# Patient Record
Sex: FEMALE | Race: WHITE | Employment: STUDENT | URBAN - METROPOLITAN AREA
[De-identification: names, ages, dates, MRNs, and addresses within clinical notes are randomized per-mention and may not be internally consistent; named-entity substitution may affect disease eponyms.]

---
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
## 2019-06-03 ENCOUNTER — OFFICE VISIT (OUTPATIENT)
Dept: URGENT CARE | Facility: URGENT CARE | Age: 19
End: 2019-06-03
Payer: COMMERCIAL

## 2019-06-03 VITALS
DIASTOLIC BLOOD PRESSURE: 70 MMHG | OXYGEN SATURATION: 99 % | HEART RATE: 89 BPM | TEMPERATURE: 97.5 F | WEIGHT: 128 LBS | SYSTOLIC BLOOD PRESSURE: 106 MMHG | BODY MASS INDEX: 20.66 KG/M2

## 2019-06-03 DIAGNOSIS — J02.9 ACUTE PHARYNGITIS, UNSPECIFIED ETIOLOGY: Primary | ICD-10-CM

## 2019-06-03 DIAGNOSIS — H65.91 OME (OTITIS MEDIA WITH EFFUSION), RIGHT: ICD-10-CM

## 2019-06-03 PROCEDURE — 99213 OFFICE O/P EST LOW 20 MIN: CPT | Performed by: NURSE PRACTITIONER

## 2019-06-03 RX ORDER — AMOXICILLIN 875 MG
875 TABLET ORAL 2 TIMES DAILY
Qty: 20 TABLET | Refills: 0 | Status: SHIPPED | OUTPATIENT
Start: 2019-06-03 | End: 2019-09-25

## 2019-06-03 ASSESSMENT — ENCOUNTER SYMPTOMS
RESPIRATORY NEGATIVE: 1
NEUROLOGICAL NEGATIVE: 1
CARDIOVASCULAR NEGATIVE: 1
SORE THROAT: 1
CONSTITUTIONAL NEGATIVE: 1

## 2019-06-04 NOTE — PROGRESS NOTES
HPI  Mechelle Metcalf 18 year old present with 2 week hx of sore throat. Now she has accompanying ear pain.     Past Medical History:   Diagnosis Date     NO ACTIVE PROBLEMS       Patient Active Problem List   Diagnosis     ADHD, predominantly inattentive type     Disruptive behavior disorder      Past Surgical History:   Procedure Laterality Date     NO HISTORY OF SURGERY        No Known Allergies  Current Outpatient Medications   Medication     amoxicillin (AMOXIL) 875 MG tablet     desogestrel-ethinyl estradiol (APRI) 0.15-30 MG-MCG per tablet     No current facility-administered medications for this visit.          Review of Systems   Constitutional: Negative.    HENT: Positive for ear pain and sore throat.    Respiratory: Negative.    Cardiovascular: Negative.    Skin: Negative.    Neurological: Negative.    Endo/Heme/Allergies: Negative.    All other systems reviewed and are negative.        Physical Exam   Constitutional: She is oriented to person, place, and time. She appears well-developed and well-nourished. No distress.   /70   Pulse 89   Temp 97.5  F (36.4  C) (Tympanic)   Wt 58.1 kg (128 lb)   SpO2 99%   BMI 20.66 kg/m .    HENT:   Head: Normocephalic.   Nose: Nose normal.   Right TM red and bulging  Pharynx red    Eyes: Conjunctivae are normal.   Cardiovascular: Normal rate, regular rhythm and normal heart sounds.   Pulmonary/Chest: Effort normal and breath sounds normal.   Musculoskeletal: Normal range of motion.   Lymphadenopathy:     She has cervical adenopathy.   Neurological: She is alert and oriented to person, place, and time.   Skin: Skin is warm and dry. No rash noted.   Nursing note and vitals reviewed.    Assessment:  1. Acute pharyngitis, unspecified etiology    2. OME (otitis media with effusion), right        Plan:  Orders Placed This Encounter     amoxicillin (AMOXIL) 875 MG tablet   Tylenol or Ibuprofen as directed on package for pain or fever  Instructions regarding self-care of  patient/child reviewed.   Written instructions provided in after visit summary and reviewed.  Patient instructed to see primary care provider for new or persistent symptoms.   Red flag symptoms reviewed and patient has been instructed to seek emergent   Care at the closest emergency room for evaluation and treatment.   Please contact pharmacy for medication questions.  Patient instructed to take medications as directed on package.      ROLANDO King, CNP

## 2019-06-04 NOTE — PATIENT INSTRUCTIONS
Patient Education     Pharyngitis (Sore Throat), Report Pending    Pharyngitis (sore throat) is often due to a virus. It can also be caused by streptococcus (strep), bacteria. This is often called strep throat. Both viral and strep infections can cause throat pain that is worse when swallowing, aching all over, headache, and fever. Both types of infections are contagious. They may be spread by coughing, kissing, or touching others after touching your mouth or nose.  A test has been done to find out if you or your child have strep throat. Call this facility or your healthcare provider if you were not given your test results. If the test is positive for strep infection, you will need to take antibiotic medicines. A prescription can be called into your pharmacy at that time. If the test is negative, you probably have a viral pharyngitis. This does not need to be treated with antibiotics. Until you receive the results of the strep test, you should stay home from work. If your child is being tested, he or she should stay home from school.  Home care    Rest at home. Drink plenty of fluids so you won't get dehydrated.    If the test is positive for strep, you or your child should not go to work or school for the first 2 days of taking the antibiotics. After this time, you or your child will not be contagious. You or your child can then return to work or school when feeling better.     Use the antibiotic medicine for the full 10 days. Do not stop the medicine even if you or your child feel better. This is very important to make sure the infection is fully treated. It is also important to prevent medicine-resistant germs from growing. If you or your child were given an antibiotic shot, no more antibiotics are needed.    Use throat lozenges or numbing throat sprays to help reduce pain. Gargling with warm salt water will also help reduce throat pain. Dissolve 1/2 teaspoon of salt in 1 glass of warm water. Children can sip  on juice or a popsicle. Children 5 years and older can also suck on a lollipop or hard candy.    Don't eat salty or spicy foods or give them to your child. These can irritate the throat.  Other medicine for a child: You can give your child acetaminophen for fever, fussiness, or discomfort. In babies over 6 months of age, you may use ibuprofen instead of acetaminophen. If your child has chronic liver or kidney disease or ever had a stomach ulcer or GI bleeding, talk with your child s healthcare provider before giving these medicines. Aspirin should never be used by any child under 18 years of age who has a fever. It may cause severe liver damage.  Other medicine for an adult: You may use acetaminophen or ibuprofen to control pain or fever, unless another medicine was prescribed for this. If you have chronic liver or kidney disease or ever had a stomach ulcer or GI bleeding, talk with your healthcare provider before using these medicines.  Follow-up care  Follow up with your healthcare provider or our staff if you or your child don't get better over the next week.  When to seek medical advice  Call your healthcare provider right away if any of these occur:    Fever as directed by your healthcare provider. For children, seek care if:  ? Your child is of any age and has repeated fevers above 104 F (40 C).  ? Your child is younger than 2 years of age and has a fever of 100.4 F (38 C) for more than 1 day.  ? Your child is 2 years old or older and has a fever of 100.4 F (38 C) for more than 3 days.    New or worsening ear pain, sinus pain, or headache    Painful lumps in the back of neck    Stiff neck    Lymph nodes are getting larger     Can t swallow liquids, a lot of drooling, or can t open mouth wide due to throat pain    Signs of dehydration, such as very dark urine or no urine, sunken eyes, dizziness    Trouble breathing or noisy breathing    Muffled voice    New rash    Other symptoms getting worse  Prevention  Here  are steps you can take to help prevent an infection:    Keep good hand washing habits.    Don t have close contact with people who have sore throats, colds, or other upper respiratory infections.    Don t smoke, and stay away from secondhand smoke.    Stay up to date with of your vaccines.  Date Last Reviewed: 11/1/2017 2000-2018 [employer]. [address] [place] [address]. All rights reserved. This information is not intended as a substitute for professional medical care. Always follow your healthcare professional's instructions.           Patient Education     Middle Ear Infection (Adult)  You have an infection of the middle ear, the space behind the eardrum. This is also called acute otitis media (AOM). Sometimes it is caused by the common cold. This is because congestion can block the internal passage (eustachian tube) that drains fluid from the middle ear. When the middle ear fills with fluid, bacteria can grow there and cause an infection. Oral antibiotics are used to treat this illness, not ear drops. Symptoms usually start to improve within 1 to 2 days of treatment.    Home care  The following are general care guidelines:    Finish all of the antibiotic medicine given, even though you may feel better after the first few days.    You may use over-the-counter medicine, such as acetaminophen or ibuprofen, to control pain and fever, unless something else was prescribed. If you have chronic liver or kidney disease or have ever had a stomach ulcer or gastrointestinal bleeding, talk with your healthcare provider before using these medicines. Do not give aspirin to anyone under 18 years of age who has a fever. It may cause severe illness or death.  Follow-up care  Follow up with your healthcare provider, or as advised, in 2 weeks if all symptoms have not gotten better, or if hearing doesn't go back to normal within 1 month.  When to seek medical advice  Call your healthcare provider right  away if any of these occur:    Ear pain gets worse or does not improve after 3 days of treatment    Unusual drowsiness or confusion    Neck pain, stiff neck, or headache    Fluid or blood draining from the ear canal    Fever of 100.4 F (38 C) or as advised     Seizure  Date Last Reviewed: 6/1/2016 2000-2018 [employer]. [address] [place] [address], [place] [address]. All rights reserved. This information is not intended as a substitute for professional medical care. Always follow your healthcare professional's instructions.

## 2019-09-23 ENCOUNTER — OFFICE VISIT (OUTPATIENT)
Dept: URGENT CARE | Facility: URGENT CARE | Age: 19
End: 2019-09-23
Payer: COMMERCIAL

## 2019-09-23 VITALS
OXYGEN SATURATION: 97 % | SYSTOLIC BLOOD PRESSURE: 108 MMHG | WEIGHT: 125.4 LBS | DIASTOLIC BLOOD PRESSURE: 73 MMHG | BODY MASS INDEX: 20.24 KG/M2 | HEART RATE: 78 BPM | RESPIRATION RATE: 16 BRPM | TEMPERATURE: 98.5 F

## 2019-09-23 DIAGNOSIS — R07.0 THROAT PAIN: ICD-10-CM

## 2019-09-23 DIAGNOSIS — J02.9 VIRAL PHARYNGITIS: Primary | ICD-10-CM

## 2019-09-23 LAB
DEPRECATED S PYO AG THROAT QL EIA: NORMAL
SPECIMEN SOURCE: NORMAL

## 2019-09-23 PROCEDURE — 87081 CULTURE SCREEN ONLY: CPT | Performed by: FAMILY MEDICINE

## 2019-09-23 PROCEDURE — 87880 STREP A ASSAY W/OPTIC: CPT | Performed by: FAMILY MEDICINE

## 2019-09-23 PROCEDURE — 99213 OFFICE O/P EST LOW 20 MIN: CPT | Performed by: FAMILY MEDICINE

## 2019-09-23 ASSESSMENT — ENCOUNTER SYMPTOMS
BLOOD IN STOOL: 0
DYSURIA: 0
SHORTNESS OF BREATH: 0
PSYCHIATRIC NEGATIVE: 1
NAUSEA: 0
ADENOPATHY: 0
WOUND: 0
COUGH: 0
VOMITING: 0
FEVER: 1
ABDOMINAL PAIN: 0
CHILLS: 1
SORE THROAT: 0
APPETITE CHANGE: 1
HEADACHES: 0
RHINORRHEA: 0
DIARRHEA: 0
BRUISES/BLEEDS EASILY: 0

## 2019-09-23 ASSESSMENT — PAIN SCALES - GENERAL: PAINLEVEL: SEVERE PAIN (7)

## 2019-09-23 NOTE — PROGRESS NOTES
SUBJECTIVE:   Mechelle Metcalf is a 19 year old female presenting with a chief complaint of   Chief Complaint   Patient presents with     Throat Pain     sore throat for the past week and bad for the past 3 days, fever and headach last night        Sore throat over the past 4 days. Fever last evening 102.7 last. Tylenol taken   Cough resolved today and lasted for 3-4 days. Denies rhinorrhea     Review of Systems   Constitutional: Positive for appetite change (less because of sore throat ), chills and fever.   HENT: Negative for congestion, ear pain, rhinorrhea and sore throat.    Respiratory: Negative for cough and shortness of breath.    Gastrointestinal: Negative for abdominal pain, blood in stool, diarrhea, nausea and vomiting.   Genitourinary: Negative for dysuria, menstrual problem (LMP a month ago cycle lasting 35 days and normal. denies missed periods ), pelvic pain, vaginal bleeding, vaginal discharge and vaginal pain.   Skin: Negative for rash and wound.   Allergic/Immunologic: Negative for environmental allergies and food allergies.   Neurological: Negative for headaches.   Hematological: Negative for adenopathy. Does not bruise/bleed easily.   Psychiatric/Behavioral: Negative.        Monogamous with boyfriend. Condom use always.       Patient Active Problem List   Diagnosis     ADHD, predominantly inattentive type     Disruptive behavior disorder        Past Medical History:   Diagnosis Date     NO ACTIVE PROBLEMS      Family History   Problem Relation Age of Onset     Allergies Mother      Hypertension Father      Heart Disease Father      Diabetes Paternal Grandfather      Hypertension Paternal Grandfather      Heart Disease Paternal Grandfather      Glaucoma No family hx of      Macular Degeneration No family hx of      Thyroid Disease No family hx of      Cerebrovascular Disease No family hx of      Cancer No family hx of      Current Outpatient Medications   Medication Sig Dispense Refill      desogestrel-ethinyl estradiol (APRI) 0.15-30 MG-MCG per tablet Take 1 tablet by mouth daily (Patient not taking: Reported on 6/28/2018) 84 tablet 3     Social History     Tobacco Use     Smoking status: Never Smoker     Smokeless tobacco: Never Used   Substance Use Topics     Alcohol use: No       OBJECTIVE  /73 (BP Location: Left arm, Patient Position: Sitting, Cuff Size: Adult Regular)   Pulse 78   Temp 98.5  F (36.9  C) (Tympanic)   Resp 16   Wt 56.9 kg (125 lb 6.4 oz)   LMP  (Approximate)   SpO2 97%   BMI 20.24 kg/m      Physical Exam  HENT:      Head: Normocephalic and atraumatic.      Right Ear: External ear normal.      Left Ear: External ear normal.      Nose: Nose normal.      Mouth/Throat:      Pharynx: No oropharyngeal exudate.   Eyes:      General: No scleral icterus.        Right eye: No discharge.         Left eye: No discharge.      Conjunctiva/sclera: Conjunctivae normal.      Pupils: Pupils are equal, round, and reactive to light.   Neck:      Musculoskeletal: Neck supple.      Thyroid: No thyromegaly.      Trachea: No tracheal deviation.   Cardiovascular:      Rate and Rhythm: Normal rate and regular rhythm.      Heart sounds: Normal heart sounds. No murmur. No friction rub. No gallop.    Pulmonary:      Effort: Pulmonary effort is normal. No respiratory distress.      Breath sounds: Normal breath sounds. No stridor. No wheezing or rales.   Chest:      Chest wall: No tenderness.   Abdominal:      General: Bowel sounds are normal. There is no distension.      Palpations: Abdomen is soft. There is no mass.      Tenderness: There is no tenderness. There is no guarding or rebound.   Musculoskeletal:         General: No tenderness or deformity.   Lymphadenopathy:      Cervical: No cervical adenopathy.   Skin:     General: Skin is warm and dry.      Findings: No erythema or rash.   Neurological:      Mental Status: She is alert and oriented to person, place, and time.      Cranial Nerves: No  cranial nerve deficit.   Psychiatric:         Judgement: Judgment normal.       Results for orders placed or performed in visit on 09/23/19   Strep, Rapid Screen   Result Value Ref Range    Specimen Description Throat     Rapid Strep A Screen       NEGATIVE: No Group A streptococcal antigen detected by immunoassay, await culture report.        ASSESSMENT:    ICD-10-CM    1. Viral pharyngitis J02.9    2. Throat pain R07.0 Strep, Rapid Screen     Beta strep group A culture        PLAN:  Symptom cares described   The patient indicates understanding of these issues and agrees with the plan. Patient educational/instructional material provided including reasons for follow-up   Lucio Britt MD

## 2019-09-24 LAB
BACTERIA SPEC CULT: NORMAL
SPECIMEN SOURCE: NORMAL

## 2019-09-25 ENCOUNTER — OFFICE VISIT (OUTPATIENT)
Dept: PEDIATRICS | Facility: CLINIC | Age: 19
End: 2019-09-25
Payer: COMMERCIAL

## 2019-09-25 VITALS
BODY MASS INDEX: 19.77 KG/M2 | SYSTOLIC BLOOD PRESSURE: 106 MMHG | DIASTOLIC BLOOD PRESSURE: 72 MMHG | HEIGHT: 66 IN | WEIGHT: 123 LBS | TEMPERATURE: 98.3 F

## 2019-09-25 DIAGNOSIS — J03.90 TONSILLITIS: ICD-10-CM

## 2019-09-25 DIAGNOSIS — Z11.8 SPECIAL SCREENING EXAMINATION FOR CHLAMYDIAL DISEASE: Primary | ICD-10-CM

## 2019-09-25 LAB — HETEROPH AB SER QL: NEGATIVE

## 2019-09-25 PROCEDURE — 99213 OFFICE O/P EST LOW 20 MIN: CPT | Performed by: PEDIATRICS

## 2019-09-25 PROCEDURE — 86665 EPSTEIN-BARR CAPSID VCA: CPT | Performed by: PEDIATRICS

## 2019-09-25 PROCEDURE — 87491 CHLMYD TRACH DNA AMP PROBE: CPT | Performed by: PEDIATRICS

## 2019-09-25 PROCEDURE — 36415 COLL VENOUS BLD VENIPUNCTURE: CPT | Performed by: PEDIATRICS

## 2019-09-25 PROCEDURE — 86665 EPSTEIN-BARR CAPSID VCA: CPT | Mod: 59 | Performed by: PEDIATRICS

## 2019-09-25 PROCEDURE — 86308 HETEROPHILE ANTIBODY SCREEN: CPT | Performed by: PEDIATRICS

## 2019-09-25 RX ORDER — CHLORHEXIDINE GLUCONATE ORAL RINSE 1.2 MG/ML
15 SOLUTION DENTAL 2 TIMES DAILY
Qty: 118 ML | Refills: 0 | Status: SHIPPED | OUTPATIENT
Start: 2019-09-25 | End: 2021-03-29

## 2019-09-25 RX ORDER — PREDNISONE 20 MG/1
60 TABLET ORAL DAILY
Qty: 15 TABLET | Refills: 0 | Status: SHIPPED | OUTPATIENT
Start: 2019-09-25 | End: 2019-09-30

## 2019-09-25 ASSESSMENT — MIFFLIN-ST. JEOR: SCORE: 1344.67

## 2019-09-25 NOTE — LETTER
September 30, 2019      Mechelle Metcalf  [address]  [facility] [address]        Dear Parent or Guardian of Mechelle Metcalf    We are writing to inform you of your child's test results.     Lab results indicate you had mono in the past, but not now.    Resulted Orders   Chlamydia trachomatis PCR   Result Value Ref Range    Specimen Description Urine       Comment:      CORRECTED ON 09/25 AT 1428: PREVIOUSLY REPORTED AS Midstream Urine    Chlamydia Trachomatis PCR Negative NEG^Negative      Comment:      Negative for C. trachomatis rRNA by transcription mediated amplification.  A negative result by transcription mediated amplification does not preclude   the presence of C. trachomatis infection because results are dependent on   proper and adequate collection, absence of inhibitors, and sufficient rRNA to   be detected.     Mononucleosis screen   Result Value Ref Range    Mononucleosis Screen Negative NEG^Negative   EBV Capsid Antibody IgM   Result Value Ref Range    EBV Capsid Antibody IgM 0.4 0.0 - 0.8 AI      Comment:      No detectable antibody.  Antibody index (AI) values reflect qualitative changes in antibody   concentration that cannot be directly associated with clinical condition or   disease state.     EBV Capsid Antibody IgG   Result Value Ref Range    EBV Capsid Antibody IgG >8.0 (H) 0.0 - 0.8 AI      Comment:      Positive, suggests recent or past exposure  Antibody index (AI) values reflect qualitative changes in antibody   concentration that cannot be directly associated with clinical condition or   disease state.         If you have any questions or concerns, please call the clinic at the number listed above.       Sincerely,        Guevara Garcia MD/na

## 2019-09-25 NOTE — PROGRESS NOTES
"Meri Metcalf is a 19 year old female who presents to clinic today with self because of:  Pharyngitis     HPI   Concerns: pt here for ongoing throat pain, fever ( 2 days ago),sores in tonsils, ear pain -was seen on 9/23, negative for strep -losing voice, and not being able to eat, or drink. Pt lost 2 pounds in last 2 days.          Review of Systems  Constitutional, eye, ENT, skin, respiratory, cardiac, and GI are normal except as otherwise noted.    Problem List  Patient Active Problem List    Diagnosis Date Noted     ADHD, predominantly inattentive type 09/01/2011     Priority: Medium     Diagnosed at [facility]       Disruptive behavior disorder 09/01/2011     Priority: Medium     Diagnosed at [facility]        Medications  No current outpatient medications on file prior to visit.  No current facility-administered medications on file prior to visit.     Allergies  No Known Allergies  Reviewed and updated as needed this visit by Provider  Problems           Objective    /72   Temp 98.3  F (36.8  C) (Oral)   Ht 5' 6\" (1.676 m)   Wt 123 lb (55.8 kg)   BMI 19.85 kg/m    Normalized weight-for-age data not available for patients older than 20 years.    Physical Exam  GENERAL: Active, alert, in no acute distress.  SKIN: Clear. No significant rash, abnormal pigmentation or lesions  HEAD: Normocephalic.  EYES:  No discharge or erythema. Normal pupils and EOM.  EARS: Normal canals. Tympanic membranes are normal; gray and translucent.  NOSE: Normal without discharge.  MOUTH/THROAT: moderate erythema on the pharynx and ulcers on tonslis  NECK: Supple, no masses.  LYMPH NODES: anterior cervical: enlarged tender nodes  LUNGS: Clear. No rales, rhonchi, wheezing or retractions  HEART: Regular rhythm. Normal S1/S2. No murmurs.  ABDOMEN: Soft, non-tender, not distended, no masses or hepatosplenomegaly. Bowel sounds normal.     Diagnostics: Monospot:  negative  Chlamydia PCR - pending      Assessment & Plan    Viral " tonsillitis with ulcers  Peridex swish and spit BID  Prednisone po x 5 days    Follow Up  If not improving or if worsening    Guevara Garcia MD

## 2019-09-25 NOTE — PROGRESS NOTES
"Subjective    Mechelle Metcalf is a 19 year old female who presents to clinic today with {Side:5061} because of:  Pharyngitis     HPI   ENT/Cough Symptoms    Problem started: {NUMBER1-12:738864} {DAYS:1002} ago  Fever: {.:205787::\"no\"}  Runny nose: {.:104843::\"no\"}  Congestion: {.:815224::\"no\"}  Sore Throat: {.:104603::\"no\"}  Cough: {.:493182::\"no\"}  Eye discharge/redness:  {.:692557::\"no\"}  Ear Pain: {.:528892::\"no\"}  Wheeze: {.:867022::\"no\"}   Sick contacts: {Contacts:228985}  Strep exposure: {Contacts:420179}  Therapies Tried: ***    {roomer to stop here, delete this reminder}  ***    {additional problems for the provider to add (optional):110399}    Review of Systems  {ROS Choices (Optional):734150}    Problem List  Patient Active Problem List    Diagnosis Date Noted     ADHD, predominantly inattentive type 2011     Priority: Medium     Diagnosed at [place]       Disruptive behavior disorder 2011     Priority: Medium     Diagnosed at [place]        Medications  [] amoxicillin (AMOXIL) 875 MG tablet, Take 1 tablet (875 mg) by mouth 2 times daily for 10 days  desogestrel-ethinyl estradiol (APRI) 0.15-30 MG-MCG per tablet, Take 1 tablet by mouth daily (Patient not taking: Reported on 2018)    No current facility-administered medications on file prior to visit.     Allergies  No Known Allergies  Reviewed and updated as needed this visit by Provider           Objective    There were no vitals taken for this visit.  Normalized weight-for-age data not available for patients older than 20 years.    Physical Exam  {Exam choices (Optional):322121}    {Diagnostics (Optional):577075::\"None\"}      Assessment & Plan    {Diagnosis Options:778067}    Follow Up  No follow-ups on file.  {other follow up (Optional):039141}    Guevara Garcia MD        "

## 2019-09-25 NOTE — LETTER
September 26, 2019      Mechelle Metcalf  [address] [facility] [address]        Dear ,    We are writing to inform you of your test results.    Your test results fall within the expected range(s) or remain unchanged from previous results.  Please continue with current treatment plan.    Resulted Orders   Chlamydia trachomatis PCR   Result Value Ref Range    Specimen Description Urine       Comment:      CORRECTED ON 09/25 AT 1428: PREVIOUSLY REPORTED AS Midstream Urine    Chlamydia Trachomatis PCR Negative NEG^Negative      Comment:      Negative for C. trachomatis rRNA by transcription mediated amplification.  A negative result by transcription mediated amplification does not preclude   the presence of C. trachomatis infection because results are dependent on   proper and adequate collection, absence of inhibitors, and sufficient rRNA to   be detected.     Mononucleosis screen   Result Value Ref Range    Mononucleosis Screen Negative NEG^Negative       If you have any questions or concerns, please call the clinic at the number listed above.       Sincerely,      Guevara Garcia MD / richa

## 2019-09-26 LAB
C TRACH DNA SPEC QL NAA+PROBE: NEGATIVE
SPECIMEN SOURCE: NORMAL

## 2019-09-27 LAB
EBV VCA IGG SER QL IA: >8 AI (ref 0–0.8)
EBV VCA IGM SER QL IA: 0.4 AI (ref 0–0.8)

## 2020-07-20 ENCOUNTER — TRANSFERRED RECORDS (OUTPATIENT)
Dept: HEALTH INFORMATION MANAGEMENT | Facility: CLINIC | Age: 20
End: 2020-07-20

## 2020-07-20 LAB
C TRACH DNA SPEC QL PROBE+SIG AMP: NOT DETECTED
N GONORRHOEA DNA SPEC QL PROBE+SIG AMP: NOT DETECTED
SPECIMEN DESCRIP: NORMAL
SPECIMEN DESCRIPTION: NORMAL

## 2020-08-15 ENCOUNTER — OFFICE VISIT (OUTPATIENT)
Dept: URGENT CARE | Facility: URGENT CARE | Age: 20
End: 2020-08-15
Payer: COMMERCIAL

## 2020-08-15 VITALS
DIASTOLIC BLOOD PRESSURE: 60 MMHG | HEART RATE: 111 BPM | TEMPERATURE: 98.5 F | BODY MASS INDEX: 20.98 KG/M2 | OXYGEN SATURATION: 98 % | SYSTOLIC BLOOD PRESSURE: 108 MMHG | RESPIRATION RATE: 18 BRPM | WEIGHT: 130 LBS

## 2020-08-15 DIAGNOSIS — N10 PYELONEPHRITIS, ACUTE: Primary | ICD-10-CM

## 2020-08-15 DIAGNOSIS — R82.90 NONSPECIFIC FINDING ON EXAMINATION OF URINE: ICD-10-CM

## 2020-08-15 DIAGNOSIS — R11.0 NAUSEA: ICD-10-CM

## 2020-08-15 DIAGNOSIS — R10.31 RLQ ABDOMINAL PAIN: ICD-10-CM

## 2020-08-15 DIAGNOSIS — R10.9 RIGHT FLANK DISCOMFORT: ICD-10-CM

## 2020-08-15 LAB
ALBUMIN UR-MCNC: >=300 MG/DL
APPEARANCE UR: ABNORMAL
BACTERIA #/AREA URNS HPF: ABNORMAL /HPF
BILIRUB UR QL STRIP: NEGATIVE
COLOR UR AUTO: YELLOW
GLUCOSE UR STRIP-MCNC: NEGATIVE MG/DL
HGB UR QL STRIP: ABNORMAL
KETONES UR STRIP-MCNC: NEGATIVE MG/DL
LEUKOCYTE ESTERASE UR QL STRIP: ABNORMAL
NITRATE UR QL: POSITIVE
NON-SQ EPI CELLS #/AREA URNS LPF: ABNORMAL /LPF
PH UR STRIP: 6.5 PH (ref 5–7)
RBC #/AREA URNS AUTO: ABNORMAL /HPF
SOURCE: ABNORMAL
SP GR UR STRIP: 1.02 (ref 1–1.03)
UROBILINOGEN UR STRIP-ACNC: 0.2 EU/DL (ref 0.2–1)
WBC #/AREA URNS AUTO: ABNORMAL /HPF

## 2020-08-15 PROCEDURE — 87186 SC STD MICRODIL/AGAR DIL: CPT | Performed by: FAMILY MEDICINE

## 2020-08-15 PROCEDURE — 96372 THER/PROPH/DIAG INJ SC/IM: CPT | Performed by: FAMILY MEDICINE

## 2020-08-15 PROCEDURE — 87086 URINE CULTURE/COLONY COUNT: CPT | Performed by: FAMILY MEDICINE

## 2020-08-15 PROCEDURE — 99215 OFFICE O/P EST HI 40 MIN: CPT | Mod: 25 | Performed by: FAMILY MEDICINE

## 2020-08-15 PROCEDURE — 87088 URINE BACTERIA CULTURE: CPT | Performed by: FAMILY MEDICINE

## 2020-08-15 PROCEDURE — 81001 URINALYSIS AUTO W/SCOPE: CPT | Performed by: FAMILY MEDICINE

## 2020-08-15 RX ORDER — ONDANSETRON 4 MG/1
4 TABLET, ORALLY DISINTEGRATING ORAL ONCE
Status: DISCONTINUED | OUTPATIENT
Start: 2020-08-15 | End: 2020-08-15

## 2020-08-15 RX ORDER — ONDANSETRON 4 MG/1
4 TABLET, ORALLY DISINTEGRATING ORAL EVERY 8 HOURS PRN
Qty: 10 TABLET | Refills: 0 | Status: SHIPPED | OUTPATIENT
Start: 2020-08-15 | End: 2021-03-29

## 2020-08-15 RX ORDER — SULFAMETHOXAZOLE/TRIMETHOPRIM 800-160 MG
1 TABLET ORAL 2 TIMES DAILY
Qty: 14 TABLET | Refills: 0 | Status: SHIPPED | OUTPATIENT
Start: 2020-08-15 | End: 2020-08-22

## 2020-08-15 RX ORDER — CEFTRIAXONE SODIUM 1 G
1 VIAL (EA) INJECTION ONCE
Status: COMPLETED | OUTPATIENT
Start: 2020-08-15 | End: 2020-08-15

## 2020-08-15 RX ADMIN — Medication 1 G: at 15:51

## 2020-08-15 NOTE — PATIENT INSTRUCTIONS
Bactrim antibiotic twice a day for 7 days to treat for kidney/bladder infection      If you develop fever/vomiting/confusion/lightheadedness/ increasing back or abdominal pain go to the emergency room right away      Zofran take every 4-6 hours as needed for nausea      Stay hydrated: 80 ounces of water a day

## 2020-08-15 NOTE — PROGRESS NOTES
Subjective:   Mechelle Metcalf is a 19 year old female who presents for   Chief Complaint   Patient presents with     Urgent Care     Abdominal Pain     righ side traveling onto the lower back.Started today, but increasing.     Discomfort she rates at about 8-10. She feels it is a dull pulsing pain.   Denies hx of abdominal surgeries.   She is absent of fever.     Bowel movements have been regular - no recent constipation.   Denies vaginal discharge other than vaginal spotting since getting her mirena in the last month. Mild discomfort after the IUD placement which has improved, intercourse has not been an issue.     Denies hx of STDs    Last UTI over a year ago - maybe had symptoms of pain with urination/burning a couple days ago but this had resolved. No ongoing urinary frequency.     Laying flat is more discomforting. She is feeling nauseous from the discomfort with small dry heaving. She has not had diarrhea.   No heavy lifting required at her job. Does various exercises at home including ab exercises yesterday this wasn't bothersome  Her last period was about a month ago, they are irregular as she is on mirena. No history of painful periods.     Has not had food yet today last meal was last night.     Patient Active Problem List    Diagnosis Date Noted     ADHD, predominantly inattentive type 09/01/2011     Priority: Medium     Diagnosed at [facility]       Disruptive behavior disorder 09/01/2011     Priority: Medium     Diagnosed at [facility]         Current Outpatient Medications   Medication     levonorgestrel (MIRENA) 20 MCG/24HR IUD     ondansetron (ZOFRAN-ODT) 4 MG ODT tab     sulfamethoxazole-trimethoprim (BACTRIM DS) 800-160 MG tablet     chlorhexidine (PERIDEX) 0.12 % solution     Current Facility-Administered Medications   Medication     cefTRIAXone (ROCEPHIN) injection 1 g     ondansetron (ZOFRAN-ODT) ODT tab 4 mg       ROS:   ROS: 10 point ROS neg other than the symptoms noted above in the HPI.      Objective:    /60   Pulse 111   Temp 98.5  F (36.9  C) (Tympanic)   Resp 18   Wt 59 kg (130 lb)   LMP 07/09/2020 (Approximate)   SpO2 98%   Breastfeeding No   BMI 20.98 kg/m  , Body mass index is 20.98 kg/m .  Gen:  NAD, well-nourished, sitting in chair comfortably  HEENT: EOMI, sclera anicteric, Head normocephalic, ; nares patent; moist mucous membranes  Neck: trachea midline, no thyromegaly  CV:  Hemodynamically stable, RRR  Pulm:  no increased work of breathing   ABD: soft, non-distended, normoactive, mild focal tenderness in RLQ c , jones's negative, non-distended, no left sided abdominal pain, psoas sign negative, jumping off an elevated step does not increase pain in abdomen  Back: prominent tenderness of R flank with percussion  Extrem: no cyanosis, edema or clubbing  Skin: no obvious rashes or abnormalities  Psych: Euthymic, linear thoughts, normal rate of speech  Gait: normal    Results for orders placed or performed in visit on 08/15/20   UA with Microscopic reflex to Culture     Status: Abnormal    Specimen: Midstream Urine   Result Value Ref Range    Color Urine Yellow     Appearance Urine Cloudy     Glucose Urine Negative NEG^Negative mg/dL    Bilirubin Urine Negative NEG^Negative    Ketones Urine Negative NEG^Negative mg/dL    Specific Gravity Urine 1.025 1.003 - 1.035    pH Urine 6.5 5.0 - 7.0 pH    Protein Albumin Urine >=300 (A) NEG^Negative mg/dL    Urobilinogen Urine 0.2 0.2 - 1.0 EU/dL    Nitrite Urine Positive (A) NEG^Negative    Blood Urine Large (A) NEG^Negative    Leukocyte Esterase Urine Large (A) NEG^Negative    Source Midstream Urine     WBC Urine  (A) OTO5^0 - 5 /HPF    RBC Urine 25-50 (A) OTO2^O - 2 /HPF    Squamous Epithelial /LPF Urine Moderate (A) FEW^Few /LPF    Bacteria Urine Many (A) NEG^Negative /HPF     Assessment & Plan:   Mechelle Metcalf, 19 year old female who presents with:    Pyelonephritis, acute  - sulfamethoxazole-trimethoprim (BACTRIM DS) 800-160 MG tablet  Dispense:  14 tablet; Refill: 0    RLQ abdominal pain  - UA with Microscopic reflex to Culture    Nonspecific finding on examination of urine  - Urine Culture Aerobic Bacterial    Right flank discomfort  Nausea  - ondansetron (ZOFRAN-ODT) 4 MG ODT tab  Dispense: 10 tablet; Refill: 0    With UA findings along with prominent right flank discomfort her symptoms favor pyelonephritis/cystitis as the leading diagnosis. We discussed that appendicitis is still certainly a possibility although the likelihood of having both this and pyelonephritis is low. 1 gram ceftriaxone was given today. She was prescribed bactrim twice daily for 7 days. Good oral hydration encouraged.   Normal vital signs and without fever concern for sepsis is low at this time. PRN zofran was made available to her.   If symptoms worsen she was given guidelines on when to go to the emergency room    >40 minutes was spent with the patient face to face of which more than 50% of this time was spent discussing diagnosis, treatment options, and performing counseling.       Jayro Angelo MD   [facility]    The use of Dragon/Docracy dictation services may have been used to construct the content in this note; any grammatical or spelling errors are non-intentional. Please contact the author of this note directly if you are in need of any clarification.

## 2020-08-15 NOTE — NURSING NOTE
Clinic Administered Medication Documentation      Injectable Medication Documentation    Patient was given Ceftriaxone Sodium (Rocephin). Prior to medication administration, verified patients identity using patient s name and date of birth. Please see MAR and medication order for additional information. Patient instructed to remain in clinic for 15 minutes.      Was entire vial of medication used? Yes  Vial/Syringe: Single dose vial  Expiration Date:  5/2022  Was this medication supplied by the patient? No    Name of provider who requested the medication administration: DR. Jayro Angelo  Name of provider on site (faculty or community preceptor) at the time of performing the medication administration:     Date of next administration: One time  Date of next office visit with provider to renew medication plan (must be seen annually): N/A    Sen Vega MA

## 2020-08-17 LAB
BACTERIA SPEC CULT: ABNORMAL
SPECIMEN SOURCE: ABNORMAL

## 2021-01-04 ENCOUNTER — HEALTH MAINTENANCE LETTER (OUTPATIENT)
Age: 21
End: 2021-01-04

## 2021-03-29 ENCOUNTER — OFFICE VISIT (OUTPATIENT)
Dept: FAMILY MEDICINE | Facility: CLINIC | Age: 21
End: 2021-03-29
Payer: COMMERCIAL

## 2021-03-29 VITALS
SYSTOLIC BLOOD PRESSURE: 109 MMHG | BODY MASS INDEX: 19.78 KG/M2 | HEART RATE: 88 BPM | WEIGHT: 126 LBS | DIASTOLIC BLOOD PRESSURE: 71 MMHG | RESPIRATION RATE: 16 BRPM | OXYGEN SATURATION: 100 % | TEMPERATURE: 97.7 F | HEIGHT: 67 IN

## 2021-03-29 DIAGNOSIS — Z01.818 PREOP GENERAL PHYSICAL EXAM: Primary | ICD-10-CM

## 2021-03-29 DIAGNOSIS — M95.0 NASAL DEFORMITY: ICD-10-CM

## 2021-03-29 LAB
BASOPHILS # BLD AUTO: 0 10E9/L (ref 0–0.2)
BASOPHILS NFR BLD AUTO: 0.3 %
DIFFERENTIAL METHOD BLD: NORMAL
EOSINOPHIL # BLD AUTO: 0.1 10E9/L (ref 0–0.7)
EOSINOPHIL NFR BLD AUTO: 1.9 %
ERYTHROCYTE [DISTWIDTH] IN BLOOD BY AUTOMATED COUNT: 12.1 % (ref 10–15)
HCT VFR BLD AUTO: 37.7 % (ref 35–47)
HGB BLD-MCNC: 12.9 G/DL (ref 11.7–15.7)
LYMPHOCYTES # BLD AUTO: 1.6 10E9/L (ref 0.8–5.3)
LYMPHOCYTES NFR BLD AUTO: 21.9 %
MCH RBC QN AUTO: 31.2 PG (ref 26.5–33)
MCHC RBC AUTO-ENTMCNC: 34.2 G/DL (ref 31.5–36.5)
MCV RBC AUTO: 91 FL (ref 78–100)
MONOCYTES # BLD AUTO: 0.4 10E9/L (ref 0–1.3)
MONOCYTES NFR BLD AUTO: 5.4 %
NEUTROPHILS # BLD AUTO: 5.2 10E9/L (ref 1.6–8.3)
NEUTROPHILS NFR BLD AUTO: 70.5 %
PLATELET # BLD AUTO: 261 10E9/L (ref 150–450)
RBC # BLD AUTO: 4.14 10E12/L (ref 3.8–5.2)
WBC # BLD AUTO: 7.4 10E9/L (ref 4–11)

## 2021-03-29 PROCEDURE — 99213 OFFICE O/P EST LOW 20 MIN: CPT | Performed by: PHYSICIAN ASSISTANT

## 2021-03-29 PROCEDURE — 36415 COLL VENOUS BLD VENIPUNCTURE: CPT | Performed by: PHYSICIAN ASSISTANT

## 2021-03-29 PROCEDURE — 85025 COMPLETE CBC W/AUTO DIFF WBC: CPT | Performed by: PHYSICIAN ASSISTANT

## 2021-03-29 ASSESSMENT — MIFFLIN-ST. JEOR: SCORE: 1369.16

## 2021-03-29 NOTE — PATIENT INSTRUCTIONS

## 2021-03-29 NOTE — PROGRESS NOTES
[facility]  [address]  [facility] [address]  Phone: [phone]  Primary Provider: Edelmira Dowd  Pre-op Performing Provider: TEE MEHTA      PREOPERATIVE EVALUATION:  Today's date: 3/29/2021    Mechelle Metcalf is a 20 year old female who presents for a preoperative evaluation.    Surgical Information:  Surgery/Procedure: Rhinoplasty  Surgery Location: [facility]   Surgeon: Dr. Glynn   Surgery Date: 04/01/2021  Time of Surgery: 7AM  Where patient plans to recover: At home with family  Fax number for surgical facility: [phone]    Type of Anesthesia Anticipated: to be determined    Assessment & Plan     The proposed surgical procedure is considered LOW risk.    Preop general physical exam    - CBC with platelets differential    Nasal deformity             Risks and Recommendations:  The patient has the following additional risks and recommendations for perioperative complications:   - No identified additional risk factors other than previously addressed    Medication Instructions:  Patient is on no chronic medications    RECOMMENDATION:  APPROVAL GIVEN to proceed with proposed procedure, without further diagnostic evaluation.                      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  Is planning electric rhinoplasty later this week.  Has met with surgeon, does not have any further questions.  Has been feeling well, no concerns.    Patient has had surgery in past and has never had any issues with anaesthesia, bleeding or blood clots.     Preop Questions 3/29/2021   1. Have you ever had a heart attack or stroke? No   2. Have you ever had surgery on your heart or blood vessels, such as a stent placement, a coronary artery bypass, or surgery on an artery in your head, neck, heart, or legs? No   3. Do you have chest pain with activity? No   4. Do you have a history of  heart failure? No   5. Do you  currently have a cold, bronchitis or symptoms of other infection? No   6. Do you have a cough, shortness of breath, or wheezing? No   7. Do you or anyone in your family have previous history of blood clots? No   8. Do you or does anyone in your family have a serious bleeding problem such as prolonged bleeding following surgeries or cuts? No   9. Have you ever had problems with anemia or been told to take iron pills? No   10. Have you had any abnormal blood loss such as black, tarry or bloody stools, or abnormal vaginal bleeding? No   11. Have you ever had a blood transfusion? No   12. Are you willing to have a blood transfusion if it is medically needed before, during, or after your surgery? NO -    13. Have you or any of your relatives ever had problems with anesthesia? No   14. Do you have sleep apnea, excessive snoring or daytime drowsiness? No   15. Do you have any artifical heart valves or other implanted medical devices like a pacemaker, defibrillator, or continuous glucose monitor? No   16. Do you have artificial joints? No   17. Are you allergic to latex? No   18. Is there any chance that you may be pregnant? No       Health Care Directive:  Patient does not have a Health Care Directive or Living Will: Discussed advance care planning with patient; however, patient declined at this time.    Preoperative Review of :   reviewed - controlled substances prescribed by other outside provider(s).      Status of Chronic Conditions:  See problem list for active medical problems.  Problems all longstanding and stable, except as noted/documented.  See ROS for pertinent symptoms related to these conditions.      Review of Systems  CONSTITUTIONAL: NEGATIVE for fever, chills, change in weight  INTEGUMENTARY/SKIN: NEGATIVE for worrisome rashes, moles or lesions  EYES: NEGATIVE for vision changes or irritation  ENT/MOUTH: NEGATIVE for ear, mouth and throat problems  RESP: NEGATIVE for significant cough or SOB  BREAST:  "NEGATIVE for masses, tenderness or discharge  CV: NEGATIVE for chest pain, palpitations or peripheral edema  GI: NEGATIVE for nausea, abdominal pain, heartburn, or change in bowel habits  : NEGATIVE for frequency, dysuria, or hematuria  MUSCULOSKELETAL: NEGATIVE for significant arthralgias or myalgia  NEURO: NEGATIVE for weakness, dizziness or paresthesias  ENDOCRINE: NEGATIVE for temperature intolerance, skin/hair changes  HEME: NEGATIVE for bleeding problems  PSYCHIATRIC: NEGATIVE for changes in mood or affect    Patient Active Problem List    Diagnosis Date Noted     ADHD, predominantly inattentive type 09/01/2011     Priority: Medium     Diagnosed at [place]       Disruptive behavior disorder 09/01/2011     Priority: Medium     Diagnosed at [place]        Past Medical History:   Diagnosis Date     NO ACTIVE PROBLEMS      Past Surgical History:   Procedure Laterality Date     NO HISTORY OF SURGERY       Current Outpatient Medications   Medication Sig Dispense Refill     levonorgestrel (MIRENA) 20 MCG/24HR IUD 1 each by Intrauterine route once Place 7/13/2020       chlorhexidine (PERIDEX) 0.12 % solution Swish and spit 15 mLs in mouth 2 times daily (Patient not taking: Reported on 8/15/2020) 118 mL 0     ondansetron (ZOFRAN-ODT) 4 MG ODT tab Take 1 tablet (4 mg) by mouth every 8 hours as needed for nausea (Patient not taking: Reported on 3/29/2021) 10 tablet 0       No Known Allergies     Social History     Tobacco Use     Smoking status: Current Every Day Smoker     Types: Other     Smokeless tobacco: Never Used     Tobacco comment: vap   Substance Use Topics     Alcohol use: No       History   Drug Use No         Objective     /71   Pulse 88   Temp 97.7  F (36.5  C) (Skin)   Resp 16   Ht 1.702 m (5' 7\")   Wt 57.2 kg (126 lb)   LMP 03/08/2021   SpO2 100%   BMI 19.73 kg/m      Physical Exam  GENERAL APPEARANCE: alert and no distress  RESP: lungs clear to auscultation - no rales, rhonchi or " wheezes  CV: regular rate and rhythm, normal S1 S2, no S3 or S4 and no murmur, click or rub   NEURO: Normal strength and tone, sensory exam grossly normal, mentation intact and speech normal  PSYCH: mentation appears normal and affect normal/bright    No results for input(s): HGB, PLT, INR, NA, POTASSIUM, CR, A1C in the last 01325 hours.     Diagnostics:  Recent Results (from the past 24 hour(s))   CBC with platelets differential    Collection Time: 03/29/21  2:55 PM   Result Value Ref Range    WBC 7.4 4.0 - 11.0 10e9/L    RBC Count 4.14 3.8 - 5.2 10e12/L    Hemoglobin 12.9 11.7 - 15.7 g/dL    Hematocrit 37.7 35.0 - 47.0 %    MCV 91 78 - 100 fl    MCH 31.2 26.5 - 33.0 pg    MCHC 34.2 31.5 - 36.5 g/dL    RDW 12.1 10.0 - 15.0 %    Platelet Count 261 150 - 450 10e9/L    % Neutrophils 70.5 %    % Lymphocytes 21.9 %    % Monocytes 5.4 %    % Eosinophils 1.9 %    % Basophils 0.3 %    Absolute Neutrophil 5.2 1.6 - 8.3 10e9/L    Absolute Lymphocytes 1.6 0.8 - 5.3 10e9/L    Absolute Monocytes 0.4 0.0 - 1.3 10e9/L    Absolute Eosinophils 0.1 0.0 - 0.7 10e9/L    Absolute Basophils 0.0 0.0 - 0.2 10e9/L    Diff Method Automated Method       No EKG required for low risk surgery (cataract, skin procedure, breast biopsy, etc).    Revised Cardiac Risk Index (RCRI):  The patient has the following serious cardiovascular risks for perioperative complications:   - No serious cardiac risks = 0 points     RCRI Interpretation: 0 points: Class I (very low risk - 0.4% complication rate)           Signed Electronically by: Josep Paris PA-C  Copy of this evaluation report is provided to requesting physician.

## 2021-03-30 ENCOUNTER — TELEPHONE (OUTPATIENT)
Dept: FAMILY MEDICINE | Facility: CLINIC | Age: 21
End: 2021-03-30

## 2021-03-30 DIAGNOSIS — Z01.818 PRE-OPERATIVE EXAMINATION: Primary | ICD-10-CM

## 2021-03-30 NOTE — TELEPHONE ENCOUNTER
VM left asking patient to call back.  See message below.  Needs lab appointment.    Babs Priest RN

## 2021-03-30 NOTE — TELEPHONE ENCOUNTER
CASTRO Paris PA-C-Please review and sign if agree.  Please route encounter to appropriate care team pool.  Call received by [facility].    Call received from patient who stated:  1. Completed pre-op at [place] location yesterday   A. DOS: 4/1/21  2. Surgical center requires pregnancy test even though patient has IUD    Order pended.    Thank you!  MONIQUE HydeN, RN  [facility]

## 2021-03-31 DIAGNOSIS — Z01.818 PRE-OPERATIVE EXAMINATION: ICD-10-CM

## 2021-03-31 LAB — HCG UR QL: NEGATIVE

## 2021-03-31 PROCEDURE — 81025 URINE PREGNANCY TEST: CPT | Performed by: PHYSICIAN ASSISTANT

## 2021-06-15 ENCOUNTER — DOCUMENTATION ONLY (OUTPATIENT)
Dept: FAMILY MEDICINE | Facility: CLINIC | Age: 21
End: 2021-06-15

## 2021-09-26 ENCOUNTER — HEALTH MAINTENANCE LETTER (OUTPATIENT)
Age: 21
End: 2021-09-26

## 2022-01-16 ENCOUNTER — HEALTH MAINTENANCE LETTER (OUTPATIENT)
Age: 22
End: 2022-01-16

## 2023-01-08 ENCOUNTER — HEALTH MAINTENANCE LETTER (OUTPATIENT)
Age: 23
End: 2023-01-08

## 2023-04-23 ENCOUNTER — HEALTH MAINTENANCE LETTER (OUTPATIENT)
Age: 23
End: 2023-04-23